# Patient Record
Sex: MALE | Race: WHITE | URBAN - METROPOLITAN AREA
[De-identification: names, ages, dates, MRNs, and addresses within clinical notes are randomized per-mention and may not be internally consistent; named-entity substitution may affect disease eponyms.]

---

## 2017-08-03 ENCOUNTER — IMPORTED ENCOUNTER (OUTPATIENT)
Dept: URBAN - METROPOLITAN AREA CLINIC 50 | Facility: CLINIC | Age: 77
End: 2017-08-03

## 2017-08-07 ENCOUNTER — IMPORTED ENCOUNTER (OUTPATIENT)
Dept: URBAN - METROPOLITAN AREA CLINIC 50 | Facility: CLINIC | Age: 77
End: 2017-08-07

## 2018-08-13 ENCOUNTER — IMPORTED ENCOUNTER (OUTPATIENT)
Dept: URBAN - METROPOLITAN AREA CLINIC 50 | Facility: CLINIC | Age: 78
End: 2018-08-13

## 2019-08-19 ENCOUNTER — IMPORTED ENCOUNTER (OUTPATIENT)
Dept: URBAN - METROPOLITAN AREA CLINIC 50 | Facility: CLINIC | Age: 79
End: 2019-08-19

## 2020-08-19 ENCOUNTER — IMPORTED ENCOUNTER (OUTPATIENT)
Dept: URBAN - METROPOLITAN AREA CLINIC 50 | Facility: CLINIC | Age: 80
End: 2020-08-19

## 2021-04-17 ASSESSMENT — VISUAL ACUITY
OS_BAT: 20/50
OD_CC: J1+
OS_BAT: 20/30
OS_OTHER: 20/40.
OS_BAT: 20/30
OS_OTHER: 20/30. 20/40.
OD_BAT: 20/30
OS_CC: J1+
OD_OTHER: 20/70. 20/80.
OD_CC: J1+
OD_CC: 20/20
OS_PH: 20/25-
OS_CC: 20/30+
OD_OTHER: 20/30. 20/100.
OS_CC: 20/20
OS_OTHER: 20/50. 20/70.
OS_BAT: 20/40
OD_CC: J1+
OD_CC: 20/20-1
OS_CC: 20/50+
OD_OTHER: 20/70.
OD_BAT: 20/70
OD_BAT: 20/50
OD_CC: 20/20
OS_OTHER: 20/30. 20/60.
OD_BAT: 20/70
OS_CC: 20/30-2
OD_CC: 20/20
OD_OTHER: 20/50. 20/400.
OS_CC: J1+
OS_CC: J1+

## 2021-04-17 ASSESSMENT — TONOMETRY
OS_IOP_MMHG: 18
OS_IOP_MMHG: 17
OD_IOP_MMHG: 17
OD_IOP_MMHG: 17
OD_IOP_MMHG: 16
OS_IOP_MMHG: 16
OD_IOP_MMHG: 21
OS_IOP_MMHG: 21

## 2021-08-23 ENCOUNTER — PREPPED CHART (OUTPATIENT)
Dept: URBAN - METROPOLITAN AREA CLINIC 49 | Facility: CLINIC | Age: 81
End: 2021-08-23

## 2021-08-27 ENCOUNTER — ANNUAL COMPREHENSIVE EXAM (OUTPATIENT)
Dept: URBAN - METROPOLITAN AREA CLINIC 49 | Facility: CLINIC | Age: 81
End: 2021-08-27

## 2021-08-27 DIAGNOSIS — H43.813: ICD-10-CM

## 2021-08-27 DIAGNOSIS — H04.123: ICD-10-CM

## 2021-08-27 DIAGNOSIS — H25.13: ICD-10-CM

## 2021-08-27 PROCEDURE — 92014 COMPRE OPH EXAM EST PT 1/>: CPT

## 2021-08-27 PROCEDURE — 92015 DETERMINE REFRACTIVE STATE: CPT

## 2021-08-27 ASSESSMENT — VISUAL ACUITY
OS_SC: 20/30+1
OD_SC: 20/40
OS_PH: 20/25
OD_CC: 20/30-1
OD_CC: J5 @ 13IN
OU_CC: 20/25-1
OS_CC: J2 @ 13IN
OS_GLARE: 20/200
OU_CC: J1 @ 13IN
OS_CC: 20/30-1
OD_GLARE: 20/30
OS_GLARE: 20/30
OD_GLARE: 20/40

## 2021-08-27 ASSESSMENT — TONOMETRY
OS_IOP_MMHG: 20
OD_IOP_MMHG: 20

## 2022-09-16 ENCOUNTER — COMPREHENSIVE EXAM (OUTPATIENT)
Dept: URBAN - METROPOLITAN AREA CLINIC 49 | Facility: CLINIC | Age: 82
End: 2022-09-16

## 2022-09-16 DIAGNOSIS — H43.813: ICD-10-CM

## 2022-09-16 DIAGNOSIS — H04.123: ICD-10-CM

## 2022-09-16 DIAGNOSIS — H25.13: ICD-10-CM

## 2022-09-16 PROCEDURE — 92134 CPTRZ OPH DX IMG PST SGM RTA: CPT

## 2022-09-16 PROCEDURE — 92014 COMPRE OPH EXAM EST PT 1/>: CPT

## 2022-09-16 ASSESSMENT — VISUAL ACUITY
OS_CC: 20/20
OU_CC: J1+
OD_GLARE: 20/50
OD_GLARE: 20/40-2
OD_CC: 20/25-2/+2
OS_GLARE: 20/30
OS_GLARE: 20/25

## 2022-09-16 ASSESSMENT — TONOMETRY
OD_IOP_MMHG: 20
OS_IOP_MMHG: 20

## 2023-01-27 NOTE — PATIENT DISCUSSION
KDS spoke with patient, his numbers have changed and his vision is not stable, [facility] but can plan [facility] in the future. Discussed option of vitrectomy and explained the increased risk of a cataract forming after a vitrectomy. Patient understands and will wait until he is a candidate for RLE. New glasses RX given today.

## 2023-09-15 ENCOUNTER — COMPREHENSIVE EXAM (OUTPATIENT)
Dept: URBAN - METROPOLITAN AREA CLINIC 49 | Facility: LOCATION | Age: 83
End: 2023-09-15

## 2023-09-15 DIAGNOSIS — H52.03: ICD-10-CM

## 2023-09-15 DIAGNOSIS — H04.123: ICD-10-CM

## 2023-09-15 DIAGNOSIS — H43.813: ICD-10-CM

## 2023-09-15 DIAGNOSIS — H25.13: ICD-10-CM

## 2023-09-15 PROCEDURE — 92014 COMPRE OPH EXAM EST PT 1/>: CPT

## 2023-09-15 PROCEDURE — 92015 DETERMINE REFRACTIVE STATE: CPT

## 2023-09-15 ASSESSMENT — VISUAL ACUITY
OD_SC: 20/40
OS_GLARE: 20/60
OD_GLARE: 20/70
OU_CC: J2@16"
OS_SC: 20/40
OD_PH: 20/30
OD_GLARE: 20/50
OU_SC: 20/30
OS_CC: 20/20-1
OD_CC: 20/40
OS_GLARE: 20/30
OU_CC: 20/20

## 2023-09-15 ASSESSMENT — TONOMETRY
OD_IOP_MMHG: 20
OS_IOP_MMHG: 20

## 2024-09-20 ENCOUNTER — COMPREHENSIVE EXAM (OUTPATIENT)
Dept: URBAN - METROPOLITAN AREA CLINIC 49 | Facility: LOCATION | Age: 84
End: 2024-09-20

## 2024-09-20 DIAGNOSIS — H43.813: ICD-10-CM

## 2024-09-20 DIAGNOSIS — H25.13: ICD-10-CM

## 2024-09-20 DIAGNOSIS — H04.123: ICD-10-CM

## 2024-09-20 DIAGNOSIS — H52.4: ICD-10-CM

## 2024-09-20 PROCEDURE — 99214 OFFICE O/P EST MOD 30 MIN: CPT
